# Patient Record
Sex: FEMALE | Race: WHITE | URBAN - NONMETROPOLITAN AREA
[De-identification: names, ages, dates, MRNs, and addresses within clinical notes are randomized per-mention and may not be internally consistent; named-entity substitution may affect disease eponyms.]

---

## 2017-11-02 ENCOUNTER — OFFICE VISIT (OUTPATIENT)
Dept: PRIMARY CARE CLINIC | Age: 32
End: 2017-11-02

## 2017-11-02 VITALS
RESPIRATION RATE: 20 BRPM | BODY MASS INDEX: 33.66 KG/M2 | WEIGHT: 178.3 LBS | SYSTOLIC BLOOD PRESSURE: 133 MMHG | HEART RATE: 78 BPM | OXYGEN SATURATION: 96 % | HEIGHT: 61 IN | DIASTOLIC BLOOD PRESSURE: 91 MMHG | TEMPERATURE: 97.9 F

## 2017-11-02 DIAGNOSIS — J01.01 ACUTE RECURRENT MAXILLARY SINUSITIS: Primary | ICD-10-CM

## 2017-11-02 PROCEDURE — 99213 OFFICE O/P EST LOW 20 MIN: CPT | Performed by: NURSE PRACTITIONER

## 2017-11-02 RX ORDER — DOXYCYCLINE HYCLATE 100 MG
100 TABLET ORAL 2 TIMES DAILY
Qty: 14 TABLET | Refills: 0 | Status: SHIPPED | OUTPATIENT
Start: 2017-11-02 | End: 2017-11-09

## 2017-11-02 ASSESSMENT — ENCOUNTER SYMPTOMS
SORE THROAT: 1
SINUS PAIN: 1
SINUS PRESSURE: 1
COUGH: 1

## 2017-11-02 NOTE — PATIENT INSTRUCTIONS
hot, wet towel or a warm gel pack on your face 3 or 4 times a day for 5 to 10 minutes each time. · Try a decongestant nasal spray like oxymetazoline (Afrin). Do not use it for more than 3 days in a row. Using it for more than 3 days can make your congestion worse. When should you call for help? Call your doctor now or seek immediate medical care if:  · You have new or worse swelling or redness in your face or around your eyes. · You have a new or higher fever. Watch closely for changes in your health, and be sure to contact your doctor if:  · You have new or worse facial pain. · The mucus from your nose becomes thicker (like pus) or has new blood in it. · You are not getting better as expected. Where can you learn more? Go to [email or web address] and sign in to your [employer] account. Enter F250 in the 139shop box to learn more about \"Sinusitis: Care Instructions. \"     If you do not have an account, please click on the \"Sign Up Now\" link. Current as of: July 29, 2016  Content Version: 11.3  © 5054-4409 [employer]. Care instructions adapted under license by [facility] ([facility]). If you have questions about a medical condition or this instruction, always ask your healthcare professional. [address] any warranty or liability for your use of this information. Patient Education          doxycycline  Pronunciation:  DOX rogelio miramontes  Brand:  Acticlate, Adoxa, Alodox, Avidoxy, Doryx, Mondoxyne NL, Monodox, Morgidox, Ocudox Convenience Kit, Oracea, Oraxyl, Targadox, Vibramycin  What is the most important information I should know about doxycycline? You should not take this medicine if you are allergic to any tetracycline antibiotic. Children younger than 6years old should use doxycycline only in cases of severe or life-threatening conditions.  This medicine can cause permanent yellowing or graying of the teeth in children  Using doxycycline during pregnancy could harm the unborn baby or cause permanent tooth discoloration later in the baby's life. What is doxycycline? Doxycycline is a tetracycline antibiotic that fights bacteria in the body. Doxycycline is used to treat many different bacterial infections, such as acne, urinary tract infections, intestinal infections, eye infections, gonorrhea, chlamydia, periodontitis (gum disease), and others. Doxycycline is also used to treat blemishes, bumps, and acne-like lesions caused by rosacea. Doxycycline will not treat facial redness caused by rosacea. Some forms of doxycycline are used to prevent malaria, to treat anthrax, or to treat infections caused by mites, ticks, or lice. Doxycycline may also be used for purposes not listed in this medication guide. What should I discuss with my healthcare provider before taking doxycycline? You should not take this medicine if you are allergic to doxycycline or other tetracycline antibiotics such as demeclocycline, minocycline, tetracycline, or tigecycline. To make sure doxycycline is safe for you, tell your doctor if you have:  · liver disease;  · kidney disease;  · asthma or sulfite allergy;  · a history of increased pressure inside your skull;  · if you also take isotretinoin (Amnesteem, Claravis, Sotret); or  · if you take seizure medicine (carbamazepine, phenobarbital, phenytoin), or a blood thinner (warfarin, Coumadin, Daniel Gain). If you are using doxycycline to treat gonorrhea, your doctor may test you to make sure you do not also have syphilis, another sexually transmitted disease. Taking this medicine during pregnancy may affect tooth and bone development in the unborn baby. Taking doxycycline during the last half of pregnancy can cause permanent tooth discoloration later in the baby's life. Tell your doctor if you are pregnant or if you become pregnant while using this medicine. Doxycycline can make birth control pills less effective.  Ask your malaria is common. Continue taking the medicine every day during your stay and for at least 4 weeks after you leave the area. Use protective clothing, insect repellents, and mosquito netting around your bed to further prevent mosquito bites that could cause malaria. Use this medicine for the full prescribed length of time. Your symptoms may improve before the infection is completely cleared. Skipping doses may also increase your risk of further infection that is resistant to antibiotics. Doxycycline will not treat a viral infection such as the flu or a common cold. If you need surgery, tell the surgeon ahead of time that you are using doxycycline. You may need to stop using the medicine for a short time. Store at room temperature away from moisture and heat. Throw away any unused medicine after the expiration date on the label has passed. Using  doxycycline can cause damage to your kidneys. What happens if I miss a dose? Take the missed dose as soon as you remember. Skip the missed dose if it is almost time for your next scheduled dose. Do not take extra medicine to make up the missed dose. What happens if I overdose? Seek emergency medical attention or call the Poison Help line at 1-109.106.4980. What should I avoid while taking doxycycline? Do not take iron supplements, multivitamins, calcium supplements, antacids, or laxatives within 2 hours before or after taking doxycycline. Avoid taking any other antibiotics with doxycycline unless your doctor has told you to. Avoid exposure to sunlight or tanning beds. Doxycycline can make you sunburn more easily. Wear protective clothing and use sunscreen (SPF 30 or higher) when you are outdoors. Antibiotic medicines can cause diarrhea, which may be a sign of a new infection. If you have diarrhea that is watery or bloody, stop taking doxycycline and call your doctor. Do not use anti-diarrhea medicine unless your doctor tells you to.   What are the of the reach of children, never share your medicines with others, and use this medication only for the indication prescribed. Every effort has been made to ensure that the information provided by [address] is accurate, up-to-date, and complete, but no guarantee is made to that effect. Drug information contained herein may be time sensitive. [facility] information has been compiled for use by healthcare practitioners and consumers in the [place] and therefore [facility] does not warrant that uses outside of the [place] are appropriate, unless specifically indicated otherwise. [facility]'s drug information does not endorse drugs, diagnose patients or recommend therapy. [facility]'s drug information is an informational resource designed to assist licensed healthcare practitioners in caring for their patients and/or to serve consumers viewing this service as a supplement to, and not a substitute for, the expertise, skill, knowledge and judgment of healthcare practitioners. The absence of a warning for a given drug or drug combination in no way should be construed to indicate that the drug or drug combination is safe, effective or appropriate for any given patient. [facility] does not assume any responsibility for any aspect of healthcare administered with the aid of information [facility] provides. The information contained herein is not intended to cover all possible uses, directions, precautions, warnings, drug interactions, allergic reactions, or adverse effects. If you have questions about the drugs you are taking, check with your doctor, nurse or pharmacist.  Copyright 0072-2578 [address]. Version: 19.11. Revision date: 1/4/2017. Care instructions adapted under license by [facility] ([facility]). If you have questions about a medical condition or this instruction, always ask your healthcare professional. [place] [address] any warranty or liability for your use of this information. Patient Education          fluticasone nasal  Pronunciation:  floo TIK a kareene  Brand:  Flonase, Veramyst  What is the most important information I should know about fluticasone nasal?  Follow all directions on your medicine label and package. Tell each of your healthcare providers about all your medical conditions, allergies, and all medicines you use. What is fluticasone nasal?  Fluticasone is a steroid. It prevents the release of substances in the body that cause inflammation. Fluticasone nasal (for the nose) is used to treat nasal congestion, sneezing, runny nose, and itchy or watery eyes caused by seasonal or year-round allergies. The Flonase brand of this medicine for use in adults and children who are at least 3years old. Veramyst may be used in children as young as 3years old. Flonase is available without a prescription. Fluticasone nasal may also be used for purposes not listed in this medication guide. What should I discuss with my healthcare provider before using fluticasone nasal?  You should not use fluticasone nasal if you are allergic to it. Fluticasone can weaken your immune system, making it easier for you to get an infection or worsening an infection you already have or recently had. Tell your doctor about any illness or infection you have had within the past several weeks. To make sure you can safely use fluticasone nasal, tell your doctor if you have any of these other conditions:  · tuberculosis or any other infection or illness;  · glaucoma or cataracts;  · liver disease;  · herpes simplex virus of your eyes;  · sores or ulcers inside your nose; or  · if you have recently had injury of or surgery on your nose. If you use Flonase without a prescription and you have any medical conditions, ask a doctor or pharmacist if this medicine is safe for you. Also tell your doctor if you have diabetes. Steroid medicines may increase the glucose (sugar) levels in your blood or urine.  You may days or longer. If you switched to fluticasone from another steroid medicine, do not stop using the other steroid suddenly or you may have unpleasant withdrawal symptoms. Talk with your doctor about tapering your steroid dose before stopping completely. To be sure fluticasone nasal is not causing harmful effects on your nose or sinuses, your doctor may need to check your progress on a regular basis. It may take up to several days before your symptoms improve. Keep using the medication as directed and tell your doctor if your symptoms do not improve after a week of treatment. Store fluticasone nasal in an upright position at room temperature, away from moisture and heat. Throw the spray bottle away after you have used 120 sprays, even if there is still medicine left in the bottle. What happens if I miss a dose? Use the missed dose as soon as you remember. Skip the missed dose if it is almost time for your next scheduled dose. Do not use extra medicine to make up the missed dose. What happens if I overdose? Seek emergency medical attention or call the Poison Help line at 1-669.511.5021. An overdose of fluticasone nasal is not expected to produce life threatening symptoms. However, long term use of high steroid doses can lead to symptoms such as thinning skin, easy bruising, changes in the shape or location of body fat (especially in your face, neck, back, and waist), increased acne or facial hair, menstrual problems, impotence, or loss of interest in sex. What should I avoid while using fluticasone nasal?  Avoid getting the spray in your eyes or mouth. If this does happen, rinse with water. Avoid being near people who are sick or have infections. Call your doctor for preventive treatment if you are exposed to chicken pox or measles.  These conditions can be serious or even fatal in people who are using fluticasone nasal.  What are the possible side effects of fluticasone nasal?  Get emergency medical help

## 2017-11-02 NOTE — PROGRESS NOTES
[address] [facility] WALK-IN [facility] [id number]  Dept: [phone]  Dept Fax: [phone]    Wendy Estrada is a 29 y.o. female who presents to the [address] [place] [address] in Care today for her medical conditions/complaints as noted below. Wendy Estrada is c/o of Cough and Sinusitis (x three days)      HPI:   HPI  Wendy Estrada is a 29 y.o. female who presents with x  3 days of cough and sinus drainage. Symptoms began Sunday with a sore throat that has worsened. Subjective fever. Cough is described as worsened due to sinus drainage. Cough is productive of \"green\". Nasal drainage is green. Patient reports a history of recurrent sinusitis, last episode being last year. Patient reports no chronic medical history; denies emphysema and or COPD. Is a smoker of .5 ppd. 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 Past Medical History:   Diagnosis Date    COPD [facility]chronic obstructive pulmonary disease) ([facility])     Depression     Emphysema of lung ([facility])         Current Outpatient Prescriptions   Medication Sig Dispense Refill    doxycycline hyclate (VIBRA-TABS) 100 MG tablet Take 1 tablet by mouth 2 times daily for 7 days 14 tablet 0    fluticasone (FLONASE) 50 MCG/ACT nasal spray 1 spray by Nasal route 2 times daily 1 Bottle 3     No current facility-administered medications for this visit. Allergies   Allergen Reactions    Pcn [Penicillins] Rash       Subjective:      Review of Systems   Constitutional: Positive for fever. HENT: Positive for congestion, postnasal drip, sinus pain, sinus pressure and sore throat. Respiratory: Positive for cough. Neurological: Negative. Objective:     Physical Exam   Constitutional: She appears well-developed and well-nourished. She is cooperative.     Appearing to be of stated age with warm and dry skin, no apparent distress; well-appearing, well-hydrated, non-toxic, comfortable, alert and oriented, pleasant and talkative, in no apparent distress. HENT:   Head: Normocephalic. Right Ear: A middle ear effusion (clear) is present. Left Ear: Tympanic membrane is injected. Nose: Right sinus exhibits maxillary sinus tenderness. Mouth/Throat: Uvula is midline, oropharynx is clear and moist and mucous membranes are normal.   Neck: Neck supple. Cardiovascular: Normal rate, regular rhythm and normal heart sounds. No murmur heard. Pulmonary/Chest: Effort normal and breath sounds normal.    Normal respiratory effort. Lungs clear to auscultation over anterior and posterior lung fields bilaterally. No rales, rhonchi or wheezing. No SOB or coughing noted during exam conversation. Lymphadenopathy:     She has no cervical adenopathy. Nursing note and vitals reviewed. BP (!) 133/91   Pulse 78   Temp 97.9 °F (36.6 °C) (Oral)   Resp 20   Ht 5' 1\" (1.549 m)   Wt 178 lb 4.8 oz (80.9 kg)   LMP 10/24/2017   SpO2 96%   BMI 33.69 kg/m²     Assessment:     1. Acute recurrent maxillary sinusitis  doxycycline hyclate (VIBRA-TABS) 100 MG tablet       Plan:   Monika Ricci appears non-toxic, well hydrated and well appearing. We will recommend treatment today due to unilateral maxillary sinus tenderness. Reviewed Doxycycline and Flonase educated on administration of and side effects; advised not to take if she is pregnant; encouraged symptomatic care with rest, oral hydration and antihistamine-decongestant of choice. Recommended a probiotic and offered examples such as live cultured yogurts. Patient advised no chance of pregnancy as she is not sexually active. Written and verbal instructions provided. Understanding voiced. Encouraged follow up with their PCP for further management. ER or call 911 if any difficulty breathing, shortness of breath, inability to swallow, hives or temp greater than 103 degrees.        Return if symptoms worsen or fail to improve, for Referred to [facility].     Orders Placed This Encounter   Medications    doxycycline hyclate (VIBRA-TABS) 100 MG tablet     Sig: Take 1 tablet by mouth 2 times daily for 7 days     Dispense:  14 tablet     Refill:  0          Electronically signed by Kamala Dasilva NP on 11/2/2017 at 11:28 AM

## 2019-03-20 ENCOUNTER — OFFICE VISIT (OUTPATIENT)
Dept: PRIMARY CARE CLINIC | Age: 34
End: 2019-03-20
Payer: COMMERCIAL

## 2019-03-20 VITALS
SYSTOLIC BLOOD PRESSURE: 137 MMHG | WEIGHT: 182 LBS | DIASTOLIC BLOOD PRESSURE: 86 MMHG | BODY MASS INDEX: 34.39 KG/M2 | HEART RATE: 93 BPM | TEMPERATURE: 98.1 F | OXYGEN SATURATION: 98 %

## 2019-03-20 DIAGNOSIS — J01.20 ACUTE ETHMOIDAL SINUSITIS, RECURRENCE NOT SPECIFIED: Primary | ICD-10-CM

## 2019-03-20 DIAGNOSIS — H66.001 ACUTE SUPPURATIVE OTITIS MEDIA OF RIGHT EAR WITHOUT SPONTANEOUS RUPTURE OF TYMPANIC MEMBRANE, RECURRENCE NOT SPECIFIED: ICD-10-CM

## 2019-03-20 PROCEDURE — 99213 OFFICE O/P EST LOW 20 MIN: CPT | Performed by: NURSE PRACTITIONER

## 2019-03-20 RX ORDER — DOXYCYCLINE HYCLATE 100 MG
100 TABLET ORAL 2 TIMES DAILY
Qty: 20 TABLET | Refills: 0 | Status: SHIPPED | OUTPATIENT
Start: 2019-03-20 | End: 2019-03-30

## 2019-03-20 ASSESSMENT — ENCOUNTER SYMPTOMS
GASTROINTESTINAL NEGATIVE: 1
SORE THROAT: 0
SINUS PAIN: 1
EYES NEGATIVE: 1
SINUS PRESSURE: 1
SINUS COMPLAINT: 1
RHINORRHEA: 1
COUGH: 1

## 2019-09-06 ENCOUNTER — OFFICE VISIT (OUTPATIENT)
Dept: PRIMARY CARE CLINIC | Age: 34
End: 2019-09-06

## 2019-09-06 VITALS
TEMPERATURE: 98.4 F | HEART RATE: 69 BPM | BODY MASS INDEX: 34.39 KG/M2 | DIASTOLIC BLOOD PRESSURE: 83 MMHG | OXYGEN SATURATION: 100 % | SYSTOLIC BLOOD PRESSURE: 124 MMHG | WEIGHT: 182 LBS

## 2019-09-06 DIAGNOSIS — J06.9 VIRAL URI: Primary | ICD-10-CM

## 2019-09-06 DIAGNOSIS — J02.9 SORE THROAT: ICD-10-CM

## 2019-09-06 LAB — S PYO AG THROAT QL: NORMAL

## 2019-09-06 PROCEDURE — 99213 OFFICE O/P EST LOW 20 MIN: CPT | Performed by: NURSE PRACTITIONER

## 2019-09-06 PROCEDURE — 87880 STREP A ASSAY W/OPTIC: CPT | Performed by: NURSE PRACTITIONER

## 2019-09-06 RX ORDER — FLUTICASONE PROPIONATE 50 MCG
1 SPRAY, SUSPENSION (ML) NASAL DAILY
Qty: 1 BOTTLE | Refills: 0 | Status: SHIPPED | OUTPATIENT
Start: 2019-09-06 | End: 2019-09-13

## 2019-09-06 ASSESSMENT — ENCOUNTER SYMPTOMS
EYES NEGATIVE: 1
SORE THROAT: 1
COUGH: 0
TROUBLE SWALLOWING: 0
SINUS PAIN: 1
RHINORRHEA: 1

## 2019-09-06 NOTE — PATIENT INSTRUCTIONS
September 5, 2018  Content Version: 12.1  © [id number] [employer]. Care instructions adapted under license by [facility] ([facility]). If you have questions about a medical condition or this instruction, always ask your healthcare professional. [address] any warranty or liability for your use of this information. Patient Education        fluticasone nasal  Pronunciation:  floo TIK a sone  Brand:  Flonase, Veramyst, Orrie Hands  What is the most important information I should know about fluticasone nasal?  Follow all directions on your medicine label and package. Tell each of your healthcare providers about all your medical conditions, allergies, and all medicines you use. What is fluticasone nasal?  Fluticasone nasal (for the nose) is a steroid medicine that is used to treat nasal congestion, sneezing, runny nose, and itchy or watery eyes caused by seasonal or year-round allergies. The Orrie Hands brand of this medicine is for use only in adults. Veramyst may be used in children as young as 3years old. Flonase is for use in adults and children who are at least 3years old. Fluticasone nasal may also be used for purposes not listed in this medication guide. What should I discuss with my healthcare provider before using fluticasone nasal?  You should not use fluticasone nasal if you are allergic to it. Fluticasone can weaken your immune system,  making it easier for you to get an infection or worsening an infection you already have or recently had. Tell your doctor about any illness or infection you have had within the past several weeks. Tell your doctor if you have ever had:  · sores or ulcers inside your nose;  · injury of or surgery on your nose;  · glaucoma or cataracts;  · liver disease;  · diabetes;  · a weak immune system; or  · any type of infection (bacterial, fungal, viral, or parasitic).   If you use fluticasone nasal without a prescription and you have any medical conditions, prescription and over-the-counter medicines, vitamins, and herbal products. Not all possible drug interactions are listed here. Where can I get more information? Your pharmacist can provide more information about fluticasone nasal.  Remember, keep this and all other medicines out of the reach of children, never share your medicines with others, and use this medication only for the indication prescribed. Every effort has been made to ensure that the information provided by [facility] [facility] is accurate, up-to-date, and complete, but no guarantee is made to that effect. Drug information contained herein may be time sensitive. [facility] information has been compiled for use by healthcare practitioners and consumers in the [place] and therefore [facility] does not warrant that uses outside of the [place] are appropriate, unless specifically indicated otherwise. [facility]'s drug information does not endorse drugs, diagnose patients or recommend therapy. [facility] drug information is an informational resource designed to assist licensed healthcare practitioners in caring for their patients and/or to serve consumers viewing this service as a supplement to, and not a substitute for, the expertise, skill, knowledge and judgment of healthcare practitioners. The absence of a warning for a given drug or drug combination in no way should be construed to indicate that the drug or drug combination is safe, effective or appropriate for any given patient. [facility] does not assume any responsibility for any aspect of healthcare administered with the aid of information [facility] provides. The information contained herein is not intended to cover all possible uses, directions, precautions, warnings, drug interactions, allergic reactions, or adverse effects. If you have questions about the drugs you are taking, check with your doctor, nurse or pharmacist.  Copyright 0673-7621 [address]. Version: 10.01.  Revision

## 2019-09-06 NOTE — PROGRESS NOTES
well-appearing, well-hydrated, nontoxic, comfortable, alert and oriented, pleasant and talkative without apparent distress. HENT:   Head: Normocephalic. Right Ear: Tympanic membrane normal.   Left Ear: Tympanic membrane normal.   Nose: Nose normal. Right sinus exhibits no maxillary sinus tenderness and no frontal sinus tenderness. Left sinus exhibits no maxillary sinus tenderness and no frontal sinus tenderness. Mouth/Throat: Uvula is midline, oropharynx is clear and moist and mucous membranes are normal. No oropharyngeal exudate or posterior oropharyngeal erythema. Cardiovascular: Normal rate and regular rhythm. Pulmonary/Chest: Effort normal and breath sounds normal.   Lymphadenopathy:     She has no cervical adenopathy. Nursing note and vitals reviewed. /83   Pulse 69   Temp 98.4 °F (36.9 °C)   Wt 182 lb (82.6 kg)   LMP 09/03/2019 (Exact Date)   SpO2 100%   BMI 34.39 kg/m²   No results found for this visit on 09/06/19. Assessment:      Diagnosis Orders   1. Viral URI  Pseudoephedrine-DM-GG (CAPMIST DM) 60- MG TABS    fluticasone (FLONASE) 50 MCG/ACT nasal spray   2. Sore throat  POCT rapid strep A       Plan:   Discussed with patient the natural course of our illness and at this time have recommended to hold antibiotics. Diana Arndt appears nontoxic, well hydrated and well appearing although not to feel well. I have recommended continued watchful waiting and supportive care with rest, push oral hydration, cool mist humidification and Tylenol for fever per OTC labelling. Capmist DM to help with symptoms and encouraged to continue use of Flonase; administration and side effects discussed. Discussed symptoms of worsening and encouraged strict follow-up either at this office, ED and or PCP. Return for ED for worsening symptoms.     Orders Placed This Encounter   Medications    Pseudoephedrine-DM-GG (CAPMIST DM) 60- MG TABS     Sig: Take 1 tablet by mouth every 6